# Patient Record
Sex: MALE | Race: WHITE
[De-identification: names, ages, dates, MRNs, and addresses within clinical notes are randomized per-mention and may not be internally consistent; named-entity substitution may affect disease eponyms.]

---

## 2017-05-03 ENCOUNTER — HOSPITAL ENCOUNTER (OUTPATIENT)
Dept: HOSPITAL 25 - OREAST | Age: 62
Discharge: HOME | End: 2017-05-03
Attending: SPECIALIST
Payer: COMMERCIAL

## 2017-05-03 VITALS — SYSTOLIC BLOOD PRESSURE: 127 MMHG | DIASTOLIC BLOOD PRESSURE: 79 MMHG

## 2017-05-03 DIAGNOSIS — H43.812: ICD-10-CM

## 2017-05-03 DIAGNOSIS — H33.8: ICD-10-CM

## 2017-05-03 DIAGNOSIS — Z87.891: ICD-10-CM

## 2017-05-03 DIAGNOSIS — H25.11: Primary | ICD-10-CM

## 2017-05-03 PROCEDURE — V2632 POST CHMBR INTRAOCULAR LENS: HCPCS

## 2017-05-03 NOTE — OP
*** AMENDED REPORT NOW INCLUDES DATE OF OPERATION - ESIGNED BEFORE ADJUSTMENT **
*

 

DATE OF OPERATION:  05/03/17 - OR [facility]

 

DATE OF BIRTH:  [birth year]

 

SURGEON:  Houston Jaquez M.D.

 

PREOPERATIVE DIAGNOSIS:  Cataract, right eye.

 

POSTOPERATIVE DIAGNOSIS:  Cataract, right eye.

 

OPERATIVE PROCEDURE:  Phacoemulsification, right eye with IOL and CTR..

 

DESCRIPTION OF PROCEDURE:  The patient was brought to the operating room after 
being given 1/2% Alcaine with epinephrine drops in the preoperative area.  The 
eye was prepped and draped in the usual sterile fashion.  Sterile drape and 
eyelid speculum were placed.  Again, topical 1/2% Alcaine with epinephrine was 
given.  A paracentesis incision was made at the 9 o'clock position with the 
No.75 blade.  Clear cornea incision 2.2 x 2.2-mm was created at the 12 o'clock 
position starting at the anterior limbus using the 2.2-mm keratome.  The 
anterior chamber was irrigated with 0.4 mL of 1% non-preservative intracameral 
lidocaine and filled with DisCoVisc.  A capsulorrhexis was completed using the 
cystotome and the Utrata forceps.  Hydrodissection was performed with balanced 
salt solution.  The lens nucleus was removed with the Phacoemulsification 
handpiece without incident. Cortex was removed with the irrigation-aspiration 
handpiece.  The capsular bag was re-inflated using DisCoVisc and an [id number] 16 
implant was inserted with the shooter followed by a capsular tension ring, a 
CTR 12 inserted with a shooter into the capsular bag. The irrigation-aspiration 
handpiece was used to remove all residual DisCoVisc.  The eye was refilled with 
balanced salt solution and the wound checked and found to be watertight.  
Topical Maxitrol drops were given.

 

Indication for complex cataract surgery:  Status post vitrectomy requiring 
capsular tension device.

 

 [id number]/[facility] #: [id number]

MTDD

## 2017-10-11 ENCOUNTER — HOSPITAL ENCOUNTER (OUTPATIENT)
Dept: HOSPITAL 25 - OREAST | Age: 62
Discharge: HOME | End: 2017-10-11
Attending: SPECIALIST
Payer: COMMERCIAL

## 2017-10-11 VITALS — DIASTOLIC BLOOD PRESSURE: 83 MMHG | SYSTOLIC BLOOD PRESSURE: 153 MMHG

## 2017-10-11 DIAGNOSIS — H25.12: Primary | ICD-10-CM

## 2017-10-11 DIAGNOSIS — Z87.891: ICD-10-CM

## 2017-10-11 DIAGNOSIS — H33.8: ICD-10-CM

## 2017-10-11 DIAGNOSIS — R42: ICD-10-CM

## 2017-10-11 DIAGNOSIS — H43.813: ICD-10-CM

## 2017-10-11 PROCEDURE — V2632 POST CHMBR INTRAOCULAR LENS: HCPCS

## 2017-10-11 NOTE — OP
DATE OF OPERATION:  10/11/2017 - [facility]

 

YOB: 1955.

 

SURGEON:  Houston Jaquez M.D.

 

PREOPERATIVE DIAGNOSIS:  Cataract left eye.

 

POSTOPERATIVE DIAGNOSIS:  Cataract left eye.

 

OPERATIVE PROCEDURE:  Phacoemulsification left eye with IOL.

 

DESCRIPTION OF PROCEDURE:  The patient was brought to the operating room after 
being given 1/2% Alcaine with epinephrine drops in the preoperative area.  The 
eye was prepped and draped in the usual sterile fashion.  Sterile drape and 
eyelid speculum were placed.  Again, topical 1/2% Alcaine with epinephrine was 
given.  A paracentesis incision was made at the 3 o'clock position with the 
No.75 blade.  Clear cornea incision 2.2 x 2.2-mm was created at the 6 o'clock 
position starting at the anterior limbus using the 2.2-mm keratome.  The 
anterior chamber was irrigated with 0.4 mL of 1% non-preservative intracameral 
lidocaine and filled with DisCoVisc.  A capsulorrhexis was completed using the 
cystotome and the Utrata forceps. Hydrodissection was performed with balanced 
salt solution.  The lens nucleus was removed with the Phacoemulsification 
handpiece without incident.  Cortex was removed with the irrigation-aspiration 
handpiece.  The capsular bag was re-inflated using DisCoVisc and an SN60WF 17.5 
implant was inserted with the shooter. The irrigation-aspiration handpiece was 
used to remove all residual DisCoVisc.  The eye was refilled with balanced salt 
solution and the wound checked and found to be watertight.  Topical Maxitrol 
drops were given.

 

 [id number]/[facility] #: [id number]

[facility]

## 2020-01-26 ENCOUNTER — HOSPITAL ENCOUNTER (EMERGENCY)
Dept: HOSPITAL 25 - UCEAST | Age: 65
Discharge: HOME | End: 2020-01-26
Payer: MEDICARE

## 2020-01-26 VITALS — SYSTOLIC BLOOD PRESSURE: 142 MMHG | DIASTOLIC BLOOD PRESSURE: 91 MMHG

## 2020-01-26 DIAGNOSIS — Z87.891: ICD-10-CM

## 2020-01-26 DIAGNOSIS — J06.9: Primary | ICD-10-CM

## 2020-01-26 LAB
FLUAV RNA SPEC QL NAA+PROBE: NEGATIVE
FLUBV RNA SPEC QL NAA+PROBE: NEGATIVE

## 2020-01-26 PROCEDURE — G0463 HOSPITAL OUTPT CLINIC VISIT: HCPCS

## 2020-01-26 PROCEDURE — 87651 STREP A DNA AMP PROBE: CPT

## 2020-01-26 PROCEDURE — 99212 OFFICE O/P EST SF 10 MIN: CPT

## 2020-01-26 NOTE — UC
Throat Pain/Nasal [place] HPI





- HPI Summary


HPI Summary: 


64-year-old male comes in with a chief complaint of upper respiratory tract 

infection symptoms for 2 days.  He does have some rhinorrhea some postnasal 

drip he's got a sore throat.  He feels like he has upper chest congestion.  

Does feel short of breath with it.  When he coughs he does feel like he is 

moving some congestion but is not been able to get anything out.  No fevers 

measured.  The symptoms remind him of a December 2019 illness that he was 

treated with doxycycline for.





- History of Current Complaint


Chief Complaint: UCGeneralIllness


Stated Complaint: RESP COMPLAINT


Time Seen by Provider: 01/26/20 11:14


Pain Intensity: 3





- Allergies/Home Medications


Allergies/Adverse Reactions: 


 Allergies











Allergy/AdvReac Type Severity Reaction Status Date / Time


 


No Known Allergies Allergy   Verified 01/26/20 11:09














PMH/Surg Hx/FS Hx/Imm Hx


Previously Healthy: Yes





- Surgical History


Surgical History: Yes


Surgery Procedure, Year, and Place: BILAT KNEE SCOPING 1990's.  RIGHT ESWL 2014

  [facility].  right inquinal hernia 2015.  TURP 09/ 2016.  right eye cataract removal 

05/17.  LEFT EYE CATARACT SURGERY AND SECONDARY CATARACT WITH LASER 2019





- Family History


Known Family History: Positive: Non-Contributory





- Social History


Alcohol Use: Daily


Alcohol Amount: 2/DAY


Substance Use Type: None


Smoking Status (MU): Former Smoker


Have You Smoked in the Last Year: No


When Did the Patient Quit Smoking/Using Tobacco: 1977


Household Exposure Type: Cigarettes





Review of Systems


All Other Systems Reviewed And Are Negative: Yes


Constitutional: Positive: Other - SEE HPI


Skin: Positive: Negative


Eyes: Positive: Negative


ENT: Positive: Sore Throat, Nasal Discharge, Sinus Congestion


Respiratory: Positive: Cough, Other - SEE HPI


Cardiovascular: Positive: Negative


Gastrointestinal: Positive: Negative


Motor: Positive: Negative


Neurovascular: Positive: Negative


Musculoskeletal: Positive: Negative


Neurological: Positive: Negative


Psychological: Positive: Negative


Is Patient Immunocompromised?: No





Physical Exam


Triage Information Reviewed: Yes


Appearance: Well-Appearing, No Pain Distress, Well-Nourished


Vital Signs: 


 Initial Vital Signs











Temp  97.7 F   01/26/20 11:03


 


Pulse  73   01/26/20 11:03


 


Resp  18   01/26/20 11:03


 


BP  142/91   01/26/20 11:03


 


Pulse Ox  97   01/26/20 11:03











Vital Signs Reviewed: Yes


Eye Exam: Normal


Eyes: Positive: Conjunctiva Clear


ENT: Positive: Pharyngeal erythema, Nasal congestion, Nasal drainage, TMs normal


Neck: Positive: Supple


Respiratory: Positive: No respiratory distress, Rhonchi


Cardiovascular: Positive: RRR


Musculoskeletal: Positive: Strength Intact, ROM Intact


Neurological: Positive: Alert, Muscle Tone Normal


Psychological: Positive: Age Appropriate Behavior


Skin Exam: Normal





Throat Pain/Nasal Course/Dx





- Course


Course Of Treatment: 





DISCUSSED VIRAL VERSES BACTERIAL INFECTIONS AND THE ROLE OF ANTIBIOTICS. 


THE PATIENT PREFERS TO BE ON ANTIBIOTICS AT THIS TIME.





- Differential Dx/Diagnosis


Provider Diagnosis: 


 Upper respiratory infection








Discharge ED





- Sign-Out/Discharge


Documenting (check all that apply): Patient Departure


All imaging exams completed and their final reports reviewed: No Studies





- Discharge Plan


Condition: Stable


Disposition: HOME


Prescriptions: 


DOXYcycline CAP(*) [DOXYcycline 100MG CAP(*)] 100 mg PO BID #20 cap


Patient Education Materials:  Upper Respiratory Infection (ED)


Referrals: 


Jonah Jeffery MD [Primary Care Provider] - 


Additional Instructions: 


FOLLOW UP WITH YOUR DOCTOR IF NOT COMPLETELY IMPROVED.


GET REEVALUATED SOONER IF NOT IMPROVED OR WORSE OR ANY QUESTIONS OR CONCERNS.








- Billing Disposition and Condition


Condition: STABLE


Disposition: Home

## 2020-01-26 NOTE — XMS REPORT
Continuity of Care Document (CCD)

 Created on:2019



Patient:David Lemus

Sex:Male

:1955

External Reference #:[id number]





Demographics







 Address  [address] [place] [address]

 

 Home Phone  [phone]

 

 Mobile Phone  [phone]

 

 Preferred Language  en

 

 Marital Status  Declined to Specify/Unknown

 

 Hinduism Affiliation  Unknown

 

 Race  Unknown

 

 Ethnic Group  Declined to Specify/Unknown









Author







 Name  Briejsh Townsend MD

 

 Address  [address]



   [place] [address]









Care Team Providers







 Name  Role  Phone

 

 Jonah Jeffery MD - Family Medicine  Care Team Information   [phone]
[phone]

 

 Kimberly Morales N.P. - Nurse  Care Team Information   [phone]
[phone]



 Practitioner    









Problems







 Description

 

 No Information Available







Social History







 Type  Date  Description  Comments

 

 Birth Sex    Unknown  

 

 Tobacco Use  Start: Unknown End:  Former Cigarette Smoker  Quit at 22.



   Unknown    

 

 Tobacco Use  Start: Unknown  Never Smoked Cigars  

 

 Tobacco Use  Start: Unknown  Never Smoked A Pipe  

 

 Smokeless Tobacco    Never Used Smokeless Tobacco  

 

 ETOH Use    Currently Consumes Alcohol  







Allergies, Adverse Reactions, Alerts







 Description

 

 No Known Drug Allergies







Medications







 Active Medications  SIG  Qnty  Indications  Ordering Provider  Date

 

 Meclizine HCL  take 1 tablet by      Unknown  



            25mg  mouth three        



 Tablets  times a day if        



   needed        

 

 Alprazolam        Rin Le  



         0.5mg Tablets        NP  



           

 

 Tadalafil  Take 1 Tablet By      Unknown  



        5mg Tablets  Mouth Once A        



   Day.        









 History Medications









 Prednisone  40mg by mouth  15tabs  H90.5  Brijesh Townsend MD  2019 -



            50mg  once per day in        2019



 Tablets  in the morning x        



   5days 20 mg by        



   mouth once per        



   dayx 5days then        



   d/c        







Immunizations







 Description

 

 No Information Available







Vital Signs







 Date  Vital  Result  Comment

 

 2019  9:57am  Weight  230.00 lb  









 Weight  104.328 kg  

 

 Height  75 inches  6'3"

 

 Height in cm's  190.5 cm  

 

 BMI (Body Mass Index)  28.7 kg/m2  









 2019  8:38am  Weight  230.00 lb  









 Weight  104.328 kg  

 

 Height  75 inches  6'3"

 

 Height in cm's  190.5 cm  

 

 BMI (Body Mass Index)  28.7 kg/m2  







Results







 Description

 

 No Information Available







Procedures







 Date  Code  Description  Status

 

 2019  90664  Tympanometry  Completed

 

 2019  53965  Comprehensive Audiogram  Completed







Medical Devices







 Description

 

 No Information Available







Encounters







 Type  Date  Location  Provider  Dx  Diagnosis

 

 Office Visit  2019  [facility]  Brijesh Townsend  H90.5  Unspecified



   8:30a  08  MD    sensorineural hearing



           loss









 H93.12  Tinnitus, left ear







Assessments







 Date  Code  Description  Provider

 

 2019  H90.A22  Sensorineural hearing loss, unilateral, left  Brijesh Townsend MD



     ear, with restricted hearing on the  



     contralateral side  

 

 2019  H90.A22  Sensorineural hearing loss, unilateral, left  YARA Morrison



     ear, with restricted hearing on the  



     contralateral side  

 

 2019  H90.5  Unspecified sensorineural hearing loss  Brijesh Townsend MD

 

 2019  H93.12  Tinnitus, left ear  Brijesh Townsend MD







Plan of Treatment

Future Appointment(s):2019 11:00 am - Brijesh Townsend MD at [facility]  10:30 am - YARA Morrison at [facility]  
- Brijesh Townsend MDH90.A22 Sensorineural hearing loss, unilateral, left ear, 
with restricted hearing on the contralateral sideNew Labs:Blood Urea Nitrogen 
BUN, Ordered: 19Creatinine, Ordered: 19Comments:Asymmetrical 
sensorineural hearing loss no specific etiology although he is on Cialis.  No 
improvement on steroids.  I suggest MRI recheck back after MRI



Functional Status







 Description

 

 No Information Available







Mental Status







 Description

 

 No Information Available







Referrals







 Description

 

 No Information Available